# Patient Record
Sex: FEMALE | Race: WHITE | NOT HISPANIC OR LATINO | URBAN - METROPOLITAN AREA
[De-identification: names, ages, dates, MRNs, and addresses within clinical notes are randomized per-mention and may not be internally consistent; named-entity substitution may affect disease eponyms.]

---

## 2024-08-07 ENCOUNTER — OFFICE VISIT (OUTPATIENT)
Dept: URGENT CARE | Facility: CLINIC | Age: 59
End: 2024-08-07
Payer: MEDICAID

## 2024-08-07 VITALS
RESPIRATION RATE: 18 BRPM | SYSTOLIC BLOOD PRESSURE: 142 MMHG | HEART RATE: 86 BPM | OXYGEN SATURATION: 98 % | DIASTOLIC BLOOD PRESSURE: 86 MMHG

## 2024-08-07 DIAGNOSIS — S61.211A LACERATION OF LEFT INDEX FINGER WITHOUT FOREIGN BODY WITHOUT DAMAGE TO NAIL, INITIAL ENCOUNTER: Primary | ICD-10-CM

## 2024-08-07 PROBLEM — N95.2 ATROPHY OF VAGINA: Status: ACTIVE | Noted: 2017-03-29

## 2024-08-07 PROBLEM — R07.9 CHEST PAIN: Status: RESOLVED | Noted: 2024-08-07 | Resolved: 2024-08-07

## 2024-08-07 PROBLEM — E78.00 PURE HYPERCHOLESTEROLEMIA: Status: ACTIVE | Noted: 2017-04-20

## 2024-08-07 PROBLEM — R35.0 FREQUENCY OF URINATION: Status: ACTIVE | Noted: 2017-03-29

## 2024-08-07 PROBLEM — I25.10 ATHEROSCLEROSIS OF NATIVE CORONARY ARTERY OF NATIVE HEART WITHOUT ANGINA PECTORIS: Status: ACTIVE | Noted: 2017-04-20

## 2024-08-07 PROBLEM — R87.612 LGSIL ON PAP SMEAR OF CERVIX: Status: ACTIVE | Noted: 2023-06-07

## 2024-08-07 PROBLEM — R42 EPISODIC LIGHTHEADEDNESS: Status: ACTIVE | Noted: 2017-09-07

## 2024-08-07 PROBLEM — N94.10 DYSPAREUNIA, FEMALE: Status: ACTIVE | Noted: 2017-03-29

## 2024-08-07 PROBLEM — I65.23 BILATERAL CAROTID ARTERY STENOSIS: Status: ACTIVE | Noted: 2018-12-06

## 2024-08-07 PROBLEM — N89.8 VAGINAL ITCHING: Status: ACTIVE | Noted: 2024-06-25

## 2024-08-07 PROBLEM — R39.14 FEELING OF INCOMPLETE BLADDER EMPTYING: Status: ACTIVE | Noted: 2017-03-29

## 2024-08-07 PROBLEM — I10 ESSENTIAL HYPERTENSION: Status: ACTIVE | Noted: 2017-04-20

## 2024-08-07 PROBLEM — N39.46 MIXED INCONTINENCE: Status: ACTIVE | Noted: 2017-03-29

## 2024-08-07 PROBLEM — R63.5 UNINTENDED WEIGHT GAIN: Status: ACTIVE | Noted: 2024-06-25

## 2024-08-07 PROBLEM — H66.91 RIGHT OTITIS MEDIA: Status: RESOLVED | Noted: 2024-08-07 | Resolved: 2024-08-07

## 2024-08-07 PROCEDURE — 99203 OFFICE O/P NEW LOW 30 MIN: CPT

## 2024-08-07 PROCEDURE — 3077F SYST BP >= 140 MM HG: CPT

## 2024-08-07 PROCEDURE — 90471 IMMUNIZATION ADMIN: CPT

## 2024-08-07 PROCEDURE — 3079F DIAST BP 80-89 MM HG: CPT

## 2024-08-07 PROCEDURE — 90715 TDAP VACCINE 7 YRS/> IM: CPT

## 2024-08-07 PROCEDURE — 12001 RPR S/N/AX/GEN/TRNK 2.5CM/<: CPT

## 2024-08-07 RX ORDER — ASPIRIN 81 MG/1
81 TABLET ORAL DAILY
COMMUNITY

## 2024-08-07 ASSESSMENT — ENCOUNTER SYMPTOMS
CONSTITUTIONAL NEGATIVE: 1
COLOR CHANGE: 0
MUSCULOSKELETAL NEGATIVE: 1
ABDOMINAL PAIN: 0
DIARRHEA: 0
TROUBLE SWALLOWING: 0
CARDIOVASCULAR NEGATIVE: 1
COUGH: 0
NAUSEA: 0
NEUROLOGICAL NEGATIVE: 1
RESPIRATORY NEGATIVE: 1
VOMITING: 0
DIAPHORESIS: 0
RHINORRHEA: 0
DIZZINESS: 0
ARTHRALGIAS: 0
GASTROINTESTINAL NEGATIVE: 1
CHILLS: 0
FEVER: 0
FATIGUE: 0
SHORTNESS OF BREATH: 0
WOUND: 1
HEADACHES: 0
FACIAL SWELLING: 0
SORE THROAT: 0
JOINT SWELLING: 0
NUMBNESS: 0
BRUISES/BLEEDS EASILY: 0
PALPITATIONS: 0
WEAKNESS: 0
VOICE CHANGE: 0
MYALGIAS: 0

## 2024-08-07 ASSESSMENT — VISUAL ACUITY: OU: 1

## 2024-08-07 NOTE — PROGRESS NOTES
Subjective   History  Erick Davis is a 59 y.o. female who presents for Laceration.    Patient presents with laceration to left index finger in the last half hour from a knife while cutting raw pork chops. She states that it seems to keep opening with movement, but denies numbness/tingling or excessive bleeding. Patient reports believing her last tetanus was around 10 years ago.        History provided by:  Patient and medical records  Laceration  Associated symptoms: no fever and no rash      No past surgical history on file.  Social History     Tobacco Use    Smoking status: Not on file    Smokeless tobacco: Not on file   Substance Use Topics    Alcohol use: Not on file    Drug use: Not on file       Review of Systems   Review of Systems   Constitutional: Negative.  Negative for chills, diaphoresis, fatigue and fever.   HENT: Negative.  Negative for congestion, facial swelling, rhinorrhea, sore throat, trouble swallowing and voice change.    Respiratory: Negative.  Negative for cough and shortness of breath.    Cardiovascular: Negative.  Negative for chest pain and palpitations.   Gastrointestinal: Negative.  Negative for abdominal pain, diarrhea, nausea and vomiting.   Musculoskeletal: Negative.  Negative for arthralgias, joint swelling and myalgias.   Skin:  Positive for wound. Negative for color change and rash.   Neurological: Negative.  Negative for dizziness, syncope, weakness, numbness and headaches.   Hematological:  Does not bruise/bleed easily.       Objective   Vital Signs  /86   Pulse 86   Resp 18   LMP  (LMP Unknown)   SpO2 98%    All vitals have been reviewed and are stable.     Physical Exam  Physical Exam  Vitals and nursing note reviewed.   Constitutional:       General: She is awake. She is not in acute distress.     Appearance: Normal appearance. She is well-developed. She is not ill-appearing, toxic-appearing or diaphoretic.   HENT:      Head: Normocephalic and atraumatic. No  right periorbital erythema or left periorbital erythema.      Jaw: There is normal jaw occlusion.      Right Ear: External ear normal.      Left Ear: External ear normal.      Nose: Nose normal. No congestion or rhinorrhea.      Mouth/Throat:      Lips: Pink. No lesions.      Mouth: Mucous membranes are moist. No oral lesions or angioedema.      Pharynx: Oropharynx is clear.   Eyes:      General: Lids are normal. Vision grossly intact. Gaze aligned appropriately.      Extraocular Movements: Extraocular movements intact.      Conjunctiva/sclera: Conjunctivae normal.      Right eye: Right conjunctiva is not injected.      Left eye: Left conjunctiva is not injected.      Pupils: Pupils are equal, round, and reactive to light.   Cardiovascular:      Rate and Rhythm: Normal rate and regular rhythm.   Pulmonary:      Effort: Pulmonary effort is normal. No tachypnea or respiratory distress.      Breath sounds: Normal air entry. No stridor. No wheezing.   Abdominal:      General: Abdomen is flat. There is no distension.      Palpations: Abdomen is soft.   Musculoskeletal:         General: No swelling or deformity. Normal range of motion.      Right hand: Normal. No swelling, deformity or lacerations. Normal range of motion.      Left hand: Laceration present. No swelling, deformity, tenderness or bony tenderness. Normal range of motion. Normal strength. Normal sensation. Normal pulse.      Cervical back: Full passive range of motion without pain, normal range of motion and neck supple.   Skin:     General: Skin is warm and dry.      Capillary Refill: Capillary refill takes less than 2 seconds.      Coloration: Skin is not pale.      Findings: Laceration (1.8cm flap-like mostly cutaneus - left distal 2nd finger pad) present. No ecchymosis, erythema, petechiae or rash.   Neurological:      General: No focal deficit present.      Mental Status: She is alert and oriented to person, place, and time. Mental status is at baseline.       Motor: Motor function is intact.      Coordination: Coordination is intact.   Psychiatric:         Mood and Affect: Mood and affect normal.         Speech: Speech normal.         Behavior: Behavior normal. Behavior is cooperative.         Thought Content: Thought content normal.         Judgment: Judgment normal.         Diagnostic Results   No results found for this or any previous visit (from the past 48 hour(s)).    Assessment/Plan   Procedures   Patient ID: Erick Davis is a 59 y.o. female.    Laceration Repair    Date/Time: 8/7/2024 3:38 PM    Performed by: Nedra Banuelos PA-C  Authorized by: Nedra Banuelos PA-C    Consent:     Consent obtained:  Verbal    Consent given by:  Patient    Risks, benefits, and alternatives were discussed: yes      Risks discussed:  Infection, pain, poor cosmetic result and poor wound healing    Alternatives discussed:  Observation, referral and delayed treatment  Universal protocol:     Patient identity confirmed:  Verbally with patient  Anesthesia:     Anesthesia method:  None  Laceration details:     Location:  Finger    Finger location:  L index finger    Length (cm):  1.8    Depth (mm):  1  Exploration:     Limited defect created (wound extended): no      Hemostasis achieved with:  Direct pressure    Imaging outcome: foreign body not noted      Wound exploration: wound explored through full range of motion and entire depth of wound visualized      Wound extent: areolar tissue violated      Wound extent: no fascia violation noted, no foreign bodies/material noted, no muscle damage noted, no nerve damage noted and no tendon damage noted      Contaminated: yes    Treatment:     Area cleansed with:  Chlorhexidine and soap and water    Amount of cleaning:  Standard  Skin repair:     Repair method:  Steri-Strips    Number of Steri-Strips:  2  Approximation:     Approximation:  Close  Repair type:     Repair type:  Simple  Post-procedure details:     Dressing:  Splint for  protection and tube gauze    Procedure completion:  Tolerated well, no immediate complications      Problem List Items Addressed This Visit    None  Visit Diagnoses       Laceration of left index finger without foreign body without damage to nail, initial encounter    -  Primary    Relevant Orders    Tdap vaccine, age 10 years and older  (BOOSTRIX) (Completed)    Laceration Repair            UC Course  Patient disposition: Home    Red flags for reporting to ER have been reviewed with the patient.    Current diagnosis, any medication changes, and all in-office lab or radiologic results have been reviewed with the patient at the time of the visit.   If symptoms do not improve or worsen, patient is to follow up with PCP or report to the emergency room.   Patient is alert and oriented x3 and non-toxic appearing. Vital signs are stable.   Patient and/or guardian has sufficient decision-making capabilities at this time and reports understanding and agreement with the treatment plan made through shared decision-making.

## 2024-08-07 NOTE — PATIENT INSTRUCTIONS
LACERATION     - Tetanus injection was provided in office to for tetanus prophylaxis   - Laceration repair was done in office with steri-strips  - Leave original bandage on for first 24 hours    - Keep wound clean and dry, keep covered to prevent dirt from contaminating wound if active  - Do not soak wound in water (no swimming/hot tubs, baths, dishes)  - Avoid skin friction or tension as much as possible, if brace was provided wear for at least 5 days and nights and continue wearing at night until completely healed   - Glue and strips will wear off over time in 5-7 days - if edges become loose, you may trim excess, but do not pull the rest off  - Check wound daily and if wound edges separate or steri-strips lift, may replace strips    If fever, rash, increased pain, swelling. redness, or red streaks in the skin near the wound site occur, seek emergency care

## 2024-11-22 ENCOUNTER — TELEPHONE (OUTPATIENT)
Dept: URGENT CARE | Facility: CLINIC | Age: 59
End: 2024-11-22

## 2024-11-22 ENCOUNTER — OFFICE VISIT (OUTPATIENT)
Dept: URGENT CARE | Facility: CLINIC | Age: 59
End: 2024-11-22
Payer: MEDICAID

## 2024-11-22 VITALS
HEART RATE: 65 BPM | OXYGEN SATURATION: 93 % | HEIGHT: 63 IN | SYSTOLIC BLOOD PRESSURE: 132 MMHG | BODY MASS INDEX: 23.81 KG/M2 | DIASTOLIC BLOOD PRESSURE: 71 MMHG | TEMPERATURE: 97.6 F | WEIGHT: 134.4 LBS

## 2024-11-22 DIAGNOSIS — S93.402A SPRAIN OF LEFT ANKLE, UNSPECIFIED LIGAMENT, INITIAL ENCOUNTER: Primary | ICD-10-CM

## 2024-11-22 DIAGNOSIS — S99.912A INJURY OF LEFT ANKLE, INITIAL ENCOUNTER: ICD-10-CM

## 2024-11-22 PROCEDURE — 99214 OFFICE O/P EST MOD 30 MIN: CPT | Performed by: PHYSICIAN ASSISTANT

## 2024-11-22 PROCEDURE — L4350 ANKLE CONTROL ORTHO PRE OTS: HCPCS | Performed by: PHYSICIAN ASSISTANT

## 2024-11-22 PROCEDURE — 3008F BODY MASS INDEX DOCD: CPT | Performed by: PHYSICIAN ASSISTANT

## 2024-11-22 PROCEDURE — 3075F SYST BP GE 130 - 139MM HG: CPT | Performed by: PHYSICIAN ASSISTANT

## 2024-11-22 PROCEDURE — 3078F DIAST BP <80 MM HG: CPT | Performed by: PHYSICIAN ASSISTANT

## 2024-11-22 RX ORDER — ZINC GLUCONATE 50 MG
1 TABLET ORAL
COMMUNITY
Start: 2024-10-16

## 2024-11-22 RX ORDER — CHOLECALCIFEROL (VITAMIN D3) 25 MCG
TABLET ORAL
COMMUNITY

## 2024-11-22 RX ORDER — BUPROPION HYDROCHLORIDE 150 MG/1
150 TABLET ORAL
COMMUNITY

## 2024-11-22 RX ORDER — CONJUGATED ESTROGENS 0.62 MG/G
CREAM VAGINAL
COMMUNITY

## 2024-11-22 RX ORDER — METRONIDAZOLE 500 MG/1
1 TABLET ORAL
COMMUNITY
Start: 2024-06-27

## 2024-11-22 RX ORDER — POLYETHYLENE GLYCOL 400 AND PROPYLENE GLYCOL 4; 3 MG/ML; MG/ML
SOLUTION/ DROPS OPHTHALMIC
COMMUNITY
Start: 2023-09-28

## 2024-11-22 RX ORDER — PSYLLIUM HUSK 0.4 G
1500 CAPSULE ORAL DAILY
COMMUNITY
Start: 2024-09-29

## 2024-11-22 RX ORDER — MULTIVITAMIN
1 TABLET ORAL
COMMUNITY

## 2024-11-22 RX ORDER — VARENICLINE TARTRATE 0.5 (11)-1
KIT ORAL
COMMUNITY
Start: 2024-07-17

## 2024-11-22 RX ORDER — ASCORBIC ACID 500 MG
TABLET ORAL
COMMUNITY

## 2024-11-22 RX ORDER — DOCUSATE SODIUM 100 MG/1
100 CAPSULE, LIQUID FILLED ORAL DAILY PRN
COMMUNITY

## 2024-11-22 RX ORDER — GLYCOPYRROLATE AND FORMOTEROL FUMARATE 9; 4.8 UG/1; UG/1
2 AEROSOL, METERED RESPIRATORY (INHALATION) 2 TIMES DAILY
COMMUNITY

## 2024-11-22 RX ORDER — CETIRIZINE HYDROCHLORIDE 10 MG/1
10 TABLET ORAL DAILY
COMMUNITY

## 2024-11-22 RX ORDER — ALBUTEROL SULFATE 90 UG/1
INHALANT RESPIRATORY (INHALATION)
COMMUNITY

## 2024-11-22 RX ORDER — IBUPROFEN 800 MG/1
TABLET ORAL
COMMUNITY
Start: 2023-06-21

## 2024-11-22 NOTE — LETTER
November 22, 2024     Patient: Erick Davis   YOB: 1965   Date of Visit: 11/22/2024       To Whom It May Concern:    Erick Davis was seen in my clinic on 11/22/2024 at 9:10 am. Please excuse Erick for her absence from work on this day to make the appointment.    If you have any questions or concerns, please don't hesitate to call.         Sincerely,         Mary Nolen PA-C        CC: No Recipients

## 2024-11-22 NOTE — PROGRESS NOTES
Subjective   Patient ID: Erick Davis is a 59 y.o. female.    [place], possibly twisted her L ankle getting off of escalator. She mis-stepped getting off of the escalator and twisted the ankle. She caught herself, did not fall. She continued with the festival, got worse as the day went on. Denies feeling crack/pop/tearing sensation. She fractured her left foot before, but no previous injuries to the ankle. She has tried Tylenol, elevation, and ice without any relief. The medial aspect is swollen and tender, and the posterior aspect. The lateral aspect feels essentially normal.       History provided by:  Patient   used: No        Review of Systems   All other systems reviewed and are negative.      Objective     Physical Exam  Vitals reviewed.   Constitutional:       General: She is awake.      Appearance: Normal appearance. She is well-developed.   HENT:      Head: Normocephalic and atraumatic.   Cardiovascular:      Rate and Rhythm: Normal rate.   Pulmonary:      Effort: Pulmonary effort is normal.   Musculoskeletal:      Cervical back: Full passive range of motion without pain.      Right lower leg: No edema.      Left lower leg: No edema.      Right ankle: Normal.      Left ankle: Swelling (just posterior and inferior to medial malleolus) present. No deformity or ecchymosis. Tenderness present over the medial malleolus and posterior TF ligament. Normal range of motion. Anterior drawer test negative. Normal pulse.      Left Achilles Tendon: Tenderness present. No defects. Nolen's test negative.   Skin:     General: Skin is warm and dry.      Findings: No lesion or rash.   Neurological:      General: No focal deficit present.      Mental Status: She is alert and oriented to person, place, and time.      Cranial Nerves: No facial asymmetry.      Motor: Motor function is intact.      Gait: Gait is intact.   Psychiatric:         Attention and Perception:  Attention normal.         Mood and Affect: Mood and affect normal.         Assessment/Plan   Problem List Items Addressed This Visit    None  Visit Diagnoses         Codes    Sprain of left ankle, unspecified ligament, initial encounter    -  Primary S93.402A    Relevant Orders    Walking Boot Short    Injury of left ankle, initial encounter     S99.912A    Relevant Orders    XR ankle left 3+ views          Pt to have outpatient XR completed - will call with results   Gel clamshell brace applied   Pt requesting walking boot - this can be obtained at medical supply store  Continue with RICE protocol   Ortho follow up will be provided if any fracture, otherwise follow up with PCP if no improvement in 1-2 weeks     Red flag symptoms reviewed with patient and all questions answered. Patient or parent/guardian verbalized understanding and agreement with care plan as above. All in office testing reviewed with patient. If symptoms worsen or do not improve, patient is to follow up with PCP or report to the ER.    Patient disposition: Home